# Patient Record
Sex: FEMALE | Race: BLACK OR AFRICAN AMERICAN | NOT HISPANIC OR LATINO | Employment: FULL TIME | URBAN - METROPOLITAN AREA
[De-identification: names, ages, dates, MRNs, and addresses within clinical notes are randomized per-mention and may not be internally consistent; named-entity substitution may affect disease eponyms.]

---

## 2017-01-06 ENCOUNTER — ALLIED HEALTH/NURSE VISIT (OUTPATIENT)
Dept: OBGYN | Facility: CLINIC | Age: 37
End: 2017-01-06
Payer: COMMERCIAL

## 2017-01-06 ENCOUNTER — HOSPITAL ENCOUNTER (OUTPATIENT)
Dept: GENERAL RADIOLOGY | Facility: CLINIC | Age: 37
Discharge: HOME | End: 2017-01-06
Attending: OBSTETRICS & GYNECOLOGY | Admitting: ADVANCED PRACTICE MIDWIFE
Payer: COMMERCIAL

## 2017-01-06 DIAGNOSIS — Z31.41 ENCOUNTER FOR FERTILITY TESTING: ICD-10-CM

## 2017-01-06 DIAGNOSIS — Z01.812 PRE-PROCEDURE LAB EXAM: Primary | ICD-10-CM

## 2017-01-06 LAB
HCG UR QL: NORMAL
INTERNAL QC OK POCT: YES

## 2017-01-06 PROCEDURE — 25500064 ZZH RX 255 OP 636: Performed by: OBSTETRICS & GYNECOLOGY

## 2017-01-06 PROCEDURE — 40000269 ZZH STATISTIC NO CHARGE FACILITY FEE: Mod: ZF

## 2017-01-06 PROCEDURE — 74740 X-RAY FEMALE GENITAL TRACT: CPT

## 2017-01-06 PROCEDURE — 81025 URINE PREGNANCY TEST: CPT | Mod: ZF

## 2017-01-06 PROCEDURE — 25000125 ZZHC RX 250: Performed by: OBSTETRICS & GYNECOLOGY

## 2017-01-06 RX ORDER — IOPAMIDOL 510 MG/ML
50 INJECTION, SOLUTION INTRAVASCULAR ONCE
Status: COMPLETED | OUTPATIENT
Start: 2017-01-06 | End: 2017-01-06

## 2017-01-06 RX ADMIN — LIDOCAINE HYDROCHLORIDE 3 ML: 10 INJECTION, SOLUTION EPIDURAL; INFILTRATION; INTRACAUDAL; PERINEURAL at 14:39

## 2017-01-06 RX ADMIN — IOPAMIDOL 5 ML: 510 INJECTION, SOLUTION INTRAVASCULAR at 14:39

## 2017-01-06 NOTE — NURSING NOTE
Chief Complaint   Patient presents with     Allied Health Visit     UPT for HSG   Carmen Goode LPN

## 2017-01-06 NOTE — PROGRESS NOTES
Sharon Nur is a 37 year old female who presents today for UPT for HSG , UPT was negative.  Carmen Goode , LIN

## 2017-02-20 ENCOUNTER — OFFICE VISIT (OUTPATIENT)
Dept: OBGYN | Facility: CLINIC | Age: 37
End: 2017-02-20
Attending: OBSTETRICS & GYNECOLOGY
Payer: COMMERCIAL

## 2017-02-20 VITALS
WEIGHT: 174.2 LBS | SYSTOLIC BLOOD PRESSURE: 91 MMHG | BODY MASS INDEX: 28 KG/M2 | DIASTOLIC BLOOD PRESSURE: 64 MMHG | HEART RATE: 77 BPM | HEIGHT: 66 IN

## 2017-02-20 DIAGNOSIS — R10.2 PELVIC PAIN IN FEMALE: Primary | ICD-10-CM

## 2017-02-20 DIAGNOSIS — E28.39 DIMINISHED OVARIAN RESERVE DUE TO LOW ANTRAL FOLLICLE: ICD-10-CM

## 2017-02-20 DIAGNOSIS — Z31.41 ENCOUNTER FOR FERTILITY TESTING: ICD-10-CM

## 2017-02-20 DIAGNOSIS — N80.9 ENDOMETRIOSIS: ICD-10-CM

## 2017-02-20 PROCEDURE — 76830 TRANSVAGINAL US NON-OB: CPT | Mod: ZF

## 2017-02-20 PROCEDURE — 99212 OFFICE O/P EST SF 10 MIN: CPT | Mod: ZF

## 2017-02-20 NOTE — PROGRESS NOTES
"Gynecology Visit Note  1/23/17     Reason for visit: Discuss US and labs results     S: Patient is a 38 yo [id number] recently seen by me for concerns of pelvic pain, dyspareunia and concerns for secondary infertility. At that time, plan made for labs and US to assess her symptoms and she presents today to discuss results. Since last visit patient has been having continued pelvic pain and dyspareunia, but feels it is better than previously.     O:  Vitals:    02/20/17 0906   BP: 91/64   BP Location: Left arm   Patient Position: Chair   Pulse: 77   Weight: 79 kg (174 lb 3.2 oz)   Height: 1.664 m (5' 5.5\")      General: NAD, A&Ox3  Resp: Non-labored breathing     Labs 12/29/16:  Component  Latest Ref Rng 12/29/2016   Testosterone Total  8 - 60 ng/dL 18   Sex Hormone Binding Globulin  30 - 135 nmol/L 54   Free Testosterone Calculated  0.13 - 0.92 ng/dL 0.22   TSH  0.40 - 4.00 mU/L 1.32   T4 Free  0.76 - 1.46 ng/dL 1.14   Prolactin  3 - 27 ug/L 16   Anti-Mullerian Hormone  0.272   FSH  7.9   Lutropin  6.1   Estradiol  42      HSG 1/6/17:  Hysterosalpingogram on 1/6/2017     History: Infertility     Technique: Hysterosalpingogram     Fluoroscopy Time: 17 seconds     Images demonstrate patent fallopian tubes bilaterally with normal  appearance of the fallopian tubes. The uterus is normal in appearance.  There is probable spillage on the left and perhaps on the right.      IMPRESSION: No abnormality identified.    Pelvic US 1/23/17:  Uterine findings:  Presence: Visible Size: Normal 5.8x5.5x4.4 cm. Endometrium = 7.2 mm.  Cx length = 42.6 mm.      Flexion: Midposition Position: Midline Margins: Smooth Shape: Normal  Contour: Regular Texture: Homogeneous Cavity: Normal Masses: Normal     Pelvic findings:   Right Adnexa: Normal  Left Adnexa: Normal  Bladder: Normal       Cul - de - sac fluid: None     Ovarian follicles:  Right ovary: 3.4x2.2x2.0cm.     1 follicles     Size(s): [id number]     Cyst with internal echoes - 13 x 13 x " 10mm     Left ovary: 2.9x2.2x1.4cm.     Complex cyst      Size(s): 1.8 x 1.5 x 1.2cm     Comments: Normal uterus. Patient with bilateral complex cysts, likely endometriomas, largest measuring 1.8 cm on left. Recommend repeat imaging in 8 weeks.     A/P: 38 yo  presents to discuss results of labs and US completed for concerns of pelvic pain and secondary infertility  1) Pelvic pain: US with likely small bilateral endometriomas.  Discussed with patient and recommend repeat imaging in 2 months to ensure no enlargement.  Discussed if no desire for pregnancy could consider OCP for treatment of her symptoms.  Also discussed that given current size of her endometriomas would not recommend removal, but if they grow with next ultrasound, could consider surgical management.  2) Secondary infertility: Patient with labs and evidence of low ovarian reserve with AMH of 0.272. Discussed this with patient today and with this and endometriosis, likely cause of inability to conceive. Did offer referral to [facility] given her age and patient agreed with plan.     Nirali Huynh MD

## 2017-02-20 NOTE — LETTER
Date:February 21, 2017      Patient was self referred, no letter generated. Do not send.        [facility] Health Information

## 2017-02-20 NOTE — LETTER
2/20/2017       RE: Sharon Nur  [address]  [address]  [facility] [address]     Dear Colleague,    Thank you for referring your patient, Sharon Nur, to the [facility] at [facility]. Please see a copy of my visit note below.    37 year old female with LMP 01/29/2017 presents for gynecologic ultrasound indicated by secondary infertility, pelvic pain, dyspareunia.  This study was done transvaginally.    Uterine findings:   Presence: Visible Size: Normal 5.8x5.5x4.4 cm.  Endometrium = 7.2 mm.   Cx length = 42.6 mm.      Flexion:  Midposition Position: Midline Margins: Smooth Shape: Normal   Contour: Regular Texture: Homogeneous Cavity: Normal Masses: Normal    Pelvic findings:    Right Adnexa: Normal   Left Adnexa: Normal   Bladder:  Normal         Cul - de - sac fluid: None    Ovarian follicles:   Right ovary:  3.4x2.2x2.0cm.     1 follicles     Size(s):  [id number]                Cyst with internal echoes - 13 x 13 x 10mm     Left ovary:  2.9x2.2x1.4cm.     Complex cyst      Size(s):  1.8 x 1.5 x 1.2cm      Comments:  Normal uterus.  Patient with bilateral complex cysts, likely endometriomas, largest measuring 1.8 cm on left.  Recommend repeat imaging in 8 weeks.    SKYE Meade MD        Again, thank you for allowing me to participate in the care of your patient.      Sincerely,    [facility] Ultrasound

## 2017-02-20 NOTE — LETTER
"2/20/2017       RE: Sharon Nur  [address]  [address]  [facility] [address]     Dear Colleague,    Thank you for referring your patient, Sharon Nur, to the [facility] at [facility]. Please see a copy of my visit note below.    Gynecology Visit Note  1/23/17     Reason for visit: Discuss US and labs results     S: Patient is a 36 yo [id number] recently seen by me for concerns of pelvic pain, dyspareunia and concerns for secondary infertility. At that time, plan made for labs and US to assess her symptoms and she presents today to discuss results. Since last visit patient has been having continued pelvic pain and dyspareunia, but feels it is better than previously.     O:  Vitals:    02/20/17 0906   BP: 91/64   BP Location: Left arm   Patient Position: Chair   Pulse: 77   Weight: 79 kg (174 lb 3.2 oz)   Height: 1.664 m (5' 5.5\")      General: NAD, A&Ox3  Resp: Non-labored breathing     Labs 12/29/16:  Component  Latest Ref Rng 12/29/2016   Testosterone Total  8 - 60 ng/dL 18   Sex Hormone Binding Globulin  30 - 135 nmol/L 54   Free Testosterone Calculated  0.13 - 0.92 ng/dL 0.22   TSH  0.40 - 4.00 mU/L 1.32   T4 Free  0.76 - 1.46 ng/dL 1.14   Prolactin  3 - 27 ug/L 16   Anti-Mullerian Hormone  0.272   FSH  7.9   Lutropin  6.1   Estradiol  42      HSG 1/6/17:  Hysterosalpingogram on 1/6/2017     History: Infertility     Technique: Hysterosalpingogram     Fluoroscopy Time: 17 seconds     Images demonstrate patent fallopian tubes bilaterally with normal  appearance of the fallopian tubes. The uterus is normal in appearance.  There is probable spillage on the left and perhaps on the right.      IMPRESSION: No abnormality identified.    Pelvic US 1/23/17:  Uterine findings:  Presence: Visible Size: Normal 5.8x5.5x4.4 cm. Endometrium = 7.2 mm.  Cx length = 42.6 mm.      Flexion: Midposition Position: Midline Margins: Smooth Shape: Normal  Contour: Regular " Texture: Homogeneous Cavity: Normal Masses: Normal     Pelvic findings:   Right Adnexa: Normal  Left Adnexa: Normal  Bladder: Normal       Cul - de - sac fluid: None     Ovarian follicles:  Right ovary: 3.4x2.2x2.0cm.     1 follicles     Size(s): [id number]     Cyst with internal echoes - 13 x 13 x 10mm     Left ovary: 2.9x2.2x1.4cm.     Complex cyst      Size(s): 1.8 x 1.5 x 1.2cm     Comments: Normal uterus. Patient with bilateral complex cysts, likely endometriomas, largest measuring 1.8 cm on left. Recommend repeat imaging in 8 weeks.     A/P: 36 yo  presents to discuss results of labs and US completed for concerns of pelvic pain and secondary infertility  1) Pelvic pain: US with likely small bilateral endometriomas.  Discussed with patient and recommend repeat imaging in 2 months to ensure no enlargement.  Discussed if no desire for pregnancy could consider OCP for treatment of her symptoms.  Also discussed that given current size of her endometriomas would not recommend removal, but if they grow with next ultrasound, could consider surgical management.  2) Secondary infertility: Patient with labs and evidence of low ovarian reserve with AMH of 0.272. Discussed this with patient today and with this and endometriosis, likely cause of inability to conceive. Did offer referral to [facility] given her age and patient agreed with plan.     Nirali Huynh MD      Again, thank you for allowing me to participate in the care of your patient.      Sincerely,    Nirali Huynh MD

## 2017-02-20 NOTE — PROGRESS NOTES
37 year old female with LMP 01/29/2017 presents for gynecologic ultrasound indicated by secondary infertility, pelvic pain, dyspareunia.  This study was done transvaginally.    Uterine findings:   Presence: Visible Size: Normal 5.8x5.5x4.4 cm.  Endometrium = 7.2 mm.   Cx length = 42.6 mm.      Flexion:  Midposition Position: Midline Margins: Smooth Shape: Normal   Contour: Regular Texture: Homogeneous Cavity: Normal Masses: Normal    Pelvic findings:    Right Adnexa: Normal   Left Adnexa: Normal   Bladder:  Normal         Cul - de - sac fluid: None    Ovarian follicles:   Right ovary:  3.4x2.2x2.0cm.     1 follicles     Size(s):  [id number]                Cyst with internal echoes - 13 x 13 x 10mm     Left ovary:  2.9x2.2x1.4cm.     Complex cyst      Size(s):  1.8 x 1.5 x 1.2cm      Comments:  Normal uterus.  Patient with bilateral complex cysts, likely endometriomas, largest measuring 1.8 cm on left.  Recommend repeat imaging in 8 weeks.    SKYE Meade MD

## 2017-02-20 NOTE — MR AVS SNAPSHOT
After Visit Summary   2017    Sharon Nur    MRN: [id number]           Patient Information     Date Of Birth          1980        Visit Information        Provider Department      2017 8:30 AM [facility] ULTRASOUND [facility]        Today's Diagnoses     Encounter for fertility testing           Follow-ups after your visit        Your next 10 appointments already scheduled     2017  9:45 AM CST   Return Visit with Nirali Huynh MD   [facility] ([facility])    [facility] [address]  [address]  [address]  [facility] [id number] [phone]              Who to contact     Please call your clinic at [phone] to:    Ask questions about your health    Make or cancel appointments    Discuss your medicines    Learn about your test results    Speak to your doctor   If you have compliments or concerns about an experience at your clinic, or if you wish to file a complaint, please contact [facility] Patient Relations at [phone] or email us at [email or web address]         Additional Information About Your Visit        MyChart Information     coJuvo is an electronic gateway that provides easy, online access to your medical records. With coJuvo, you can request a clinic appointment, read your test results, renew a prescription or communicate with your care team.     To sign up for coJuvo visit the website at [email or web address]   You will be asked to enter the access code listed below, as well as some personal information. Please follow the directions to create your username and password.     Your access code is: [email or web address]  Expires: 3/15/2017  9:30 AM     Your access code will  in 90 days. If you need help or a new code, please contact your [facility] Clinic or call [phone] for assistance.        Care EveryWhere ID      This is your Care EveryWhere ID. This could be used by other organizations to access your [facility] medical records  [id number]         Blood Pressure from Last 3 Encounters:   12/29/16 100/67    Weight from Last 3 Encounters:   12/29/16 79 kg (174 lb 1.6 oz)              We Performed the Following     US GYN Complete Transvaginal - 51174 (In Clinic)        Primary Care Provider    None Specified       No primary provider on file.        Thank you!     Thank you for choosing [facility]  for your care. Our goal is always to provide you with excellent care. Hearing back from our patients is one way we can continue to improve our services. Please take a few minutes to complete the written survey that you may receive in the mail after your visit with us. Thank you!             Your Updated Medication List - Protect others around you: Learn how to safely use, store and throw away your medicines at www.disposemymeds.org.      Notice  As of 2/20/2017  9:01 AM    You have not been prescribed any medications.

## 2017-02-20 NOTE — NURSING NOTE
Chief Complaint   Patient presents with     Follow Up For     Pelvic pain x 1 year       See LION Neville 2/20/2017

## 2017-02-20 NOTE — LETTER
Date:February 21, 2017      Patient was self referred, no letter generated. Do not send.        [facility] Health Information

## 2017-02-20 NOTE — MR AVS SNAPSHOT
After Visit Summary   2/20/2017    Sharon Nur    MRN: [id number]           Patient Information     Date Of Birth          1980        Visit Information        Provider Department      2/20/2017 9:45 AM Nirali Huynh MD [facility]        Today's Diagnoses     Pelvic pain in female    -  1    Endometriosis        Diminished ovarian reserve due to low antral follicle           Follow-ups after your visit        Additional Services     INFERTILITY/AI REFERRAL       Your provider has referred you to: [facility] ([facility]) [facility] [phone]   [email or web address]    Please be aware that coverage of these services is subject to the terms and limitations of your health insurance plan.  Call member services at your health plan with any benefit or coverage questions.      Please bring the following with you to your appointment:    (1) Any X-Rays, CTs or MRIs which have been performed.  Contact the facility where they were done to arrange for  prior to your scheduled appointment.    (2) List of current medications   (3) This referral request   (4) Any documents/labs given to you for this referral                  Follow-up notes from your care team     Return in about 2 months (around 4/20/2017) for Pelvic US and f/u with Lino.      Your next 10 appointments already scheduled     Apr 13, 2017  9:30 AM CDT   ULTRASOUND with [facility] ULTRASOUND   [facility] ([facility])    [facility] [address]  [address]  [address]  [place] [address]   [phone]            Apr 13, 2017 10:00 AM CDT   Return Visit with Nirali Huynh MD   [facility] ([facility])    [facility] [address]  [address]  [address]  [place] [address]   [phone]              Future tests that were ordered for you today     Open Future Orders         "Priority Expected Expires Ordered    US GYN Complete Transvaginal - 08565 (In Clinic) Routine  2017            Who to contact     Please call your clinic at [phone] to:    Ask questions about your health    Make or cancel appointments    Discuss your medicines    Learn about your test results    Speak to your doctor   If you have compliments or concerns about an experience at your clinic, or if you wish to file a complaint, please contact [facility] Patient Relations at [phone] or email us at [email or web address]         Additional Information About Your Visit        [email or web address] Information     Vontut is an electronic gateway that provides easy, online access to your medical records. With [employer], you can request a clinic appointment, read your test results, renew a prescription or communicate with your care team.     To sign up for Vontut visit the website at [email or web address]   You will be asked to enter the access code listed below, as well as some personal information. Please follow the directions to create your username and password.     Your access code is: [email or web address]  Expires: 3/15/2017  9:30 AM     Your access code will  in 90 days. If you need help or a new code, please contact your [facility] or call [phone] for assistance.        Care EveryWhere ID     This is your Care EveryWhere ID. This could be used by other organizations to access your [facility] medical records  [id number]        Your Vitals Were     Pulse Height Last Period Breastfeeding? BMI (Body Mass Index)       77 1.664 m (5' 5.5\") 2017 (Exact Date) No 28.55 kg/m2        Blood Pressure from Last 3 Encounters:   17 91/64   16 100/67    Weight from Last 3 Encounters:   17 79 kg (174 lb 3.2 oz)   16 79 kg (174 lb 1.6 oz)              We Performed the Following     INFERTILITY/AI REFERRAL        Primary " Care Provider    None Specified       No primary provider on file.        Thank you!     Thank you for choosing [facility]  for your care. Our goal is always to provide you with excellent care. Hearing back from our patients is one way we can continue to improve our services. Please take a few minutes to complete the written survey that you may receive in the mail after your visit with us. Thank you!             Your Updated Medication List - Protect others around you: Learn how to safely use, store and throw away your medicines at www.disposemymeds.org.      Notice  As of 2/20/2017 10:15 AM    You have not been prescribed any medications.

## 2018-07-16 ENCOUNTER — OFFICE VISIT (OUTPATIENT)
Dept: OBGYN | Facility: CLINIC | Age: 38
End: 2018-07-16
Attending: OBSTETRICS & GYNECOLOGY
Payer: COMMERCIAL

## 2018-07-16 VITALS
BODY MASS INDEX: 28.77 KG/M2 | HEART RATE: 81 BPM | WEIGHT: 179 LBS | HEIGHT: 66 IN | SYSTOLIC BLOOD PRESSURE: 102 MMHG | DIASTOLIC BLOOD PRESSURE: 68 MMHG

## 2018-07-16 DIAGNOSIS — N80.9 ENDOMETRIOSIS: Primary | ICD-10-CM

## 2018-07-16 NOTE — LETTER
Date:July 17, 2018      Patient was self referred, no letter generated. Do not send.        [facility] Health Information

## 2018-07-16 NOTE — LETTER
"7/16/2018       RE: Sharon Nur  [address]  [address]  [place] [address]     Dear Colleague,    Thank you for referring your patient, Sharon Nur, to the [facility] at [facility]. Please see a copy of my visit note below.    Gynecology Visit Note  7/16/18    Reason for visit: Pelvic pain    S: Patient is a 37 yo  seen by me in the past for concerns of pelvic pain, dyspareunia and secondary infertility.  Evaluation showed bilateral small endometriomas in 1/2017 (1.3-1.8 cm), normal HSG and low ovarian reserve with AMH 0.272.  Plan at that time was to repeat US in 2 months to follow size of endometriomas.  Patient did not come for that appointment scheduled 4/13/17.      Today, patient states she did not remember she was supposed to come in for follow-up after her last visit.  She is having worsening pelvic pain and cramping.  States it is the worst right before her period, but has all the time.  She does not take anything for the pain.  She is having regular monthly periods but decreased flow.  She has dyspareunia.  She is wondering what can be done about the pain she is having.    O:  Vitals:    07/16/18 1431   BP: 102/68   Pulse: 81   Weight: 81.2 kg (179 lb)   Height: 1.664 m (5' 5.5\")      General: NAD, A&Ox3  Resp: Non-labored breathing    A/P: 37 yo  presents for follow-up of pelvic pain and endometriosis  1) Pelvic pain/Endometriosis: Given her worsening pain and over 1.5 years since last scan, would recommend repeat Pelvic US.  Discussed Ibu prn to help with pain.  Discussed with discuss management options after Pelvic US and whether medical treatment of surgical treatment is warranted.  Patient understands and is agreeable with this plan.  2) RTC in 2 weeks for Pelvic US and f/u with Lino Huynh MD    Again, thank you for allowing me to participate in the care of your patient.      Sincerely,    Nirali Huynh, " MD

## 2018-07-16 NOTE — PROGRESS NOTES
"Gynecology Visit Note  7/16/18    Reason for visit: Pelvic pain    S: Patient is a 37 yo  seen by me in the past for concerns of pelvic pain, dyspareunia and secondary infertility.  Evaluation showed bilateral small endometriomas in 1/2017 (1.3-1.8 cm), normal HSG and low ovarian reserve with AMH 0.272.  Plan at that time was to repeat US in 2 months to follow size of endometriomas.  Patient did not come for that appointment scheduled 4/13/17.      Today, patient states she did not remember she was supposed to come in for follow-up after her last visit.  She is having worsening pelvic pain and cramping.  States it is the worst right before her period, but has all the time.  She does not take anything for the pain.  She is having regular monthly periods but decreased flow.  She has dyspareunia.  She is wondering what can be done about the pain she is having.    O:  Vitals:    07/16/18 1431   BP: 102/68   Pulse: 81   Weight: 81.2 kg (179 lb)   Height: 1.664 m (5' 5.5\")      General: NAD, A&Ox3  Resp: Non-labored breathing    A/P: 37 yo  presents for follow-up of pelvic pain and endometriosis  1) Pelvic pain/Endometriosis: Given her worsening pain and over 1.5 years since last scan, would recommend repeat Pelvic US.  Discussed Ibu prn to help with pain.  Discussed with discuss management options after Pelvic US and whether medical treatment of surgical treatment is warranted.  Patient understands and is agreeable with this plan.  2) RTC in 2 weeks for Pelvic US and f/u with Lino Huynh MD  "

## 2018-07-16 NOTE — MR AVS SNAPSHOT
"              After Visit Summary   2018    Sharon Nur    MRN: [id number]           Patient Information     Date Of Birth          1980        Visit Information        Provider Department      2018 2:15 PM Nirali Huynh MD [facility]        Today's Diagnoses     Endometriosis    -  1       Follow-ups after your visit        Follow-up notes from your care team     Return in about 2 weeks (around 2018) for Pelvic us and f/u with Lino.      Future tests that were ordered for you today     Open Future Orders        Priority Expected Expires Ordered    US Pelvic Complete with Transvaginal Routine  2019            Who to contact     Please call your clinic at [phone] to:    Ask questions about your health    Make or cancel appointments    Discuss your medicines    Learn about your test results    Speak to your doctor            Additional Information About Your Visit        MyChart Information     [employer] is an electronic gateway that provides easy, online access to your medical records. With [employer], you can request a clinic appointment, read your test results, renew a prescription or communicate with your care team.     To sign up for [employer] visit the website at [email or web address]   You will be asked to enter the access code listed below, as well as some personal information. Please follow the directions to create your username and password.     Your access code is: [email or web address]  Expires: 2018  6:31 AM     Your access code will  in 90 days. If you need help or a new code, please contact your [facility] or call [phone] for assistance.        Care EveryWhere ID     This is your Care EveryWhere ID. This could be used by other organizations to access your [place] medical records  [id number]        Your Vitals Were     Pulse Height Last Period BMI (Body Mass Index)          81 1.664 m (5' 5.5\") " 06/20/2018 (Exact Date) 29.33 kg/m2         Blood Pressure from Last 3 Encounters:   07/16/18 102/68   02/20/17 91/64   12/29/16 100/67    Weight from Last 3 Encounters:   07/16/18 81.2 kg (179 lb)   02/20/17 79 kg (174 lb 3.2 oz)   12/29/16 79 kg (174 lb 1.6 oz)               Primary Care Provider    None Specified       No primary provider on file.        Equal Access to Services     BENTLEY HENRY : Hadii aad ku hadasho Soomaali, waaxda luqadaha, qaybta kaalmada adeegyada, waxay mitchin alverto li . So [facility] [phone].    ATENCIÓN: Si habla español, tiene a francis disposición servicios gratuitos de asistencia lingüística. Llame al [phone].    We comply with applicable federal civil rights laws and [place] laws. We do not discriminate on the basis of race, color, national origin, age, disability, sex, sexual orientation, or gender identity.            Thank you!     Thank you for choosing [facility]  for your care. Our goal is always to provide you with excellent care. Hearing back from our patients is one way we can continue to improve our services. Please take a few minutes to complete the written survey that you may receive in the mail after your visit with us. Thank you!             Your Updated Medication List - Protect others around you: Learn how to safely use, store and throw away your medicines at www.disposemymeds.org.      Notice  As of 7/16/2018  2:53 PM    You have not been prescribed any medications.

## 2018-08-06 ENCOUNTER — OFFICE VISIT (OUTPATIENT)
Dept: OBGYN | Facility: CLINIC | Age: 38
End: 2018-08-06
Attending: OBSTETRICS & GYNECOLOGY
Payer: COMMERCIAL

## 2018-08-06 ENCOUNTER — RADIANT APPOINTMENT (OUTPATIENT)
Dept: ULTRASOUND IMAGING | Facility: CLINIC | Age: 38
End: 2018-08-06
Attending: OBSTETRICS & GYNECOLOGY
Payer: COMMERCIAL

## 2018-08-06 VITALS
BODY MASS INDEX: 29.12 KG/M2 | DIASTOLIC BLOOD PRESSURE: 56 MMHG | SYSTOLIC BLOOD PRESSURE: 91 MMHG | WEIGHT: 181.2 LBS | HEIGHT: 66 IN | HEART RATE: 77 BPM

## 2018-08-06 DIAGNOSIS — N94.6 DYSMENORRHEA: Primary | ICD-10-CM

## 2018-08-06 DIAGNOSIS — N80.9 ENDOMETRIOSIS: ICD-10-CM

## 2018-08-06 PROCEDURE — 76830 TRANSVAGINAL US NON-OB: CPT

## 2018-08-06 RX ORDER — IBUPROFEN 600 MG/1
600 TABLET, FILM COATED ORAL EVERY 6 HOURS PRN
Qty: 60 TABLET | Refills: 0 | Status: SHIPPED | OUTPATIENT
Start: 2018-08-06 | End: 2019-03-28

## 2018-08-06 NOTE — PROGRESS NOTES
"Gynecology Visit Note  8/6/18    Reason for visit: Follow-up US    S: Patient is a 37 yo  with recent visit for worsening pelvic pain with history of prior US showing bilateral endometriomas.  Patient had not been seen since 1/2017 so plan made for repeat US at that time.  Patient had US prior to visit today and is here to discuss results and management options of her pain.    Patient does state she continues to have pain since last visit, but has not tried anything for the pain yet to date.  Wants to know result of US.    O:  BP 91/56  Pulse 77  Ht 1.664 m (5' 5.5\")  Wt 82.2 kg (181 lb 3.2 oz)  LMP 07/21/2018 (Exact Date)  BMI 29.69 kg/m2     General: NAD, A&OX3  Resp: Non-labored breathing    Pelvic US 8/6/18:  ED 6.4 mm.  Has small anterior mid fibroid measuring 1.5 cm.  Dominant follicle in left ovary.  Has complex right ovarian cyst measuring 1.5 cm which is slightly smaller compared to last study in 2/2017.    A/P: 37 yo  presents to discuss US results completed for concerns of pelvic pain and prior US showing small bilateral endometriomas measuring 1.3-1.8 cm  1) Pelvic pain: Discussed with patient resolution of right ovarian cyst, stable/smaller left ovarian cyst.  Discussed no other obvious structural lesions to explain her pain.  Offered consideration for hormonal suppression, patient declines as she strongly desires pregnancy if it were to happen on it's own.  Again discussed Ibuprofen with menses and she is agreeable to trying this now that knows no changes with US.  Patient come back with worsening pain and to discuss attempt at hormonal suppression if desires this.    Nirali Huynh MD  "

## 2018-08-06 NOTE — LETTER
Date:August 8, 2018      Patient was self referred, no letter generated. Do not send.        [facility] Information

## 2018-08-06 NOTE — MR AVS SNAPSHOT
"              After Visit Summary   2018    Sharon Nur    MRN: [id number]           Patient Information     Date Of Birth          1980        Visit Information        Provider Department      2018 2:15 PM Nirali Huynh MD [facility]        Today's Diagnoses     Dysmenorrhea    -  1       Follow-ups after your visit        Who to contact     Please call your clinic at [phone] to:    Ask questions about your health    Make or cancel appointments    Discuss your medicines    Learn about your test results    Speak to your doctor            Additional Information About Your Visit        MyChart Information     [employer] is an electronic gateway that provides easy, online access to your medical records. With [employer], you can request a clinic appointment, read your test results, renew a prescription or communicate with your care team.     To sign up for [employer] visit the website at [email or web address]   You will be asked to enter the access code listed below, as well as some personal information. Please follow the directions to create your username and password.     Your access code is: [email or web address]  Expires: 2018  6:31 AM     Your access code will  in 90 days. If you need help or a new code, please contact your [facility] or call [phone] for assistance.        Care EveryWhere ID     This is your Care EveryWhere ID. This could be used by other organizations to access your [place] medical records  [id number]        Your Vitals Were     Pulse Height Last Period BMI (Body Mass Index)          77 1.664 m (5' 5.5\") 2018 (Exact Date) 29.69 kg/m2         Blood Pressure from Last 3 Encounters:   18 91/56   18 102/68   17 91/64    Weight from Last 3 Encounters:   18 82.2 kg (181 lb 3.2 oz)   18 81.2 kg (179 lb)   17 79 kg (174 lb 3.2 oz)              Today, you had the following     No " orders found for display         Today's Medication Changes          These changes are accurate as of 8/6/18 11:59 PM.  If you have any questions, ask your nurse or doctor.               Start taking these medicines.        Dose/Directions    ibuprofen 600 MG tablet   Commonly known as:  ADVIL/MOTRIN   Used for:  Dysmenorrhea   Started by:  Nirali Huynh MD        Dose:  600 mg   Take 1 tablet (600 mg) by mouth every 6 hours as needed for moderate pain   Quantity:  60 tablet   Refills:  0            Where to get your medicines      These medications were sent to [facility] - [place], [place] - [address]  [address] [address]     Phone:  [phone]     ibuprofen 600 MG tablet                Primary Care Provider    None Specified       No primary provider on file.        Equal Access to Services     [facility] : Zeferino Adams, wamaggie win, qaybta kaalmada lc, elana il . So [facility] [phone].    ATENCIÓN: Si habla español, tiene a francis disposición servicios gratuitos de asistencia lingüística. LlCommunity [facility] [phone].    We comply with applicable federal civil rights laws and [place] laws. We do not discriminate on the basis of race, color, national origin, age, disability, sex, sexual orientation, or gender identity.            Thank you!     Thank you for choosing [facility]  for your care. Our goal is always to provide you with excellent care. Hearing back from our patients is one way we can continue to improve our services. Please take a few minutes to complete the written survey that you may receive in the mail after your visit with us. Thank you!             Your Updated Medication List - Protect others around you: Learn how to safely use, store and throw away your medicines at www.disposemymeds.org.          This list is accurate as of 8/6/18 11:59 PM.  Always use your most recent med  list.                   Brand Name Dispense Instructions for use Diagnosis    ibuprofen 600 MG tablet    ADVIL/MOTRIN    60 tablet    Take 1 tablet (600 mg) by mouth every 6 hours as needed for moderate pain    Dysmenorrhea

## 2019-03-28 ENCOUNTER — HOSPITAL ENCOUNTER (EMERGENCY)
Facility: CLINIC | Age: 39
Discharge: HOME | End: 2019-03-28
Attending: EMERGENCY MEDICINE | Admitting: EMERGENCY MEDICINE
Payer: COMMERCIAL

## 2019-03-28 VITALS
DIASTOLIC BLOOD PRESSURE: 62 MMHG | SYSTOLIC BLOOD PRESSURE: 105 MMHG | TEMPERATURE: 98.1 F | BODY MASS INDEX: 29.25 KG/M2 | RESPIRATION RATE: 18 BRPM | WEIGHT: 178.5 LBS | OXYGEN SATURATION: 100 % | HEART RATE: 89 BPM

## 2019-03-28 DIAGNOSIS — M54.41 ACUTE BILATERAL LOW BACK PAIN WITH BILATERAL SCIATICA: ICD-10-CM

## 2019-03-28 DIAGNOSIS — N94.6 DYSMENORRHEA: ICD-10-CM

## 2019-03-28 DIAGNOSIS — M54.42 ACUTE BILATERAL LOW BACK PAIN WITH BILATERAL SCIATICA: ICD-10-CM

## 2019-03-28 PROCEDURE — 96372 THER/PROPH/DIAG INJ SC/IM: CPT

## 2019-03-28 PROCEDURE — 99284 EMERGENCY DEPT VISIT MOD MDM: CPT

## 2019-03-28 PROCEDURE — 99284 EMERGENCY DEPT VISIT MOD MDM: CPT | Mod: Z6 | Performed by: EMERGENCY MEDICINE

## 2019-03-28 PROCEDURE — 25000128 H RX IP 250 OP 636: Performed by: EMERGENCY MEDICINE

## 2019-03-28 RX ORDER — CYCLOBENZAPRINE HCL 10 MG
10 TABLET ORAL 3 TIMES DAILY PRN
Qty: 20 TABLET | Refills: 0 | Status: SHIPPED | OUTPATIENT
Start: 2019-03-28 | End: 2019-04-03

## 2019-03-28 RX ORDER — IBUPROFEN 600 MG/1
600 TABLET, FILM COATED ORAL EVERY 6 HOURS PRN
Qty: 60 TABLET | Refills: 0 | Status: SHIPPED | OUTPATIENT
Start: 2019-03-28

## 2019-03-28 RX ORDER — KETOROLAC TROMETHAMINE 30 MG/ML
30 INJECTION, SOLUTION INTRAMUSCULAR; INTRAVENOUS ONCE
Status: COMPLETED | OUTPATIENT
Start: 2019-03-28 | End: 2019-03-28

## 2019-03-28 RX ADMIN — KETOROLAC TROMETHAMINE 30 MG: 30 INJECTION, SOLUTION INTRAMUSCULAR; INTRAVENOUS at 10:50

## 2019-03-28 ASSESSMENT — ENCOUNTER SYMPTOMS
BACK PAIN: 1
COLOR CHANGE: 0
ARTHRALGIAS: 0
EYE REDNESS: 0
DIFFICULTY URINATING: 0
HEADACHES: 0
FEVER: 0
NECK STIFFNESS: 0
SHORTNESS OF BREATH: 0
ABDOMINAL PAIN: 0
CONFUSION: 0

## 2019-03-28 NOTE — ED AVS SNAPSHOT
[facility] [place], Emergency Department  [address]  [facility] [address]  Phone:  [phone]  Fax:  [phone]                                    Sharon Nur   MRN: [id number]    Department:  [facility], Emergency Department   Date of Visit:  3/28/2019           After Visit Summary Signature Page    I have received my discharge instructions, and my questions have been answered. I have discussed any challenges I see with this plan with the nurse or doctor.    ..........................................................................................................................................  Patient/Patient Representative Signature      ..........................................................................................................................................  Patient Representative Print Name and Relationship to Patient    ..................................................               ................................................  Date                                   Time    ..........................................................................................................................................  Reviewed by Signature/Title    ...................................................              ..............................................  Date                                               Time          22EPIC Rev 08/18

## 2021-03-11 ENCOUNTER — MEDICAL CORRESPONDENCE (OUTPATIENT)
Dept: HEALTH INFORMATION MANAGEMENT | Facility: CLINIC | Age: 41
End: 2021-03-11

## 2021-03-18 ENCOUNTER — MEDICAL CORRESPONDENCE (OUTPATIENT)
Dept: ULTRASOUND IMAGING | Facility: CLINIC | Age: 41
End: 2021-03-18

## 2022-04-29 ENCOUNTER — ANCILLARY PROCEDURE (OUTPATIENT)
Dept: GENERAL RADIOLOGY | Facility: CLINIC | Age: 42
End: 2022-04-29
Attending: STUDENT IN AN ORGANIZED HEALTH CARE EDUCATION/TRAINING PROGRAM
Payer: COMMERCIAL

## 2022-04-29 DIAGNOSIS — M25.561 ACUTE PAIN OF RIGHT KNEE: ICD-10-CM

## 2022-04-29 PROCEDURE — 73564 X-RAY EXAM KNEE 4 OR MORE: CPT | Mod: RT | Performed by: RADIOLOGY

## 2022-05-18 ENCOUNTER — ANCILLARY PROCEDURE (OUTPATIENT)
Dept: MRI IMAGING | Facility: CLINIC | Age: 42
End: 2022-05-18
Attending: STUDENT IN AN ORGANIZED HEALTH CARE EDUCATION/TRAINING PROGRAM
Payer: COMMERCIAL

## 2022-05-18 DIAGNOSIS — M51.16 LUMBAR DISC DISEASE WITH RADICULOPATHY: ICD-10-CM

## 2022-05-18 PROCEDURE — 72148 MRI LUMBAR SPINE W/O DYE: CPT | Performed by: STUDENT IN AN ORGANIZED HEALTH CARE EDUCATION/TRAINING PROGRAM

## 2023-11-06 ENCOUNTER — APPOINTMENT (OUTPATIENT)
Dept: GENERAL RADIOLOGY | Facility: CLINIC | Age: 43
End: 2023-11-06
Attending: FAMILY MEDICINE
Payer: COMMERCIAL

## 2023-11-06 ENCOUNTER — APPOINTMENT (OUTPATIENT)
Dept: CT IMAGING | Facility: CLINIC | Age: 43
End: 2023-11-06
Attending: FAMILY MEDICINE
Payer: COMMERCIAL

## 2023-11-06 ENCOUNTER — HOSPITAL ENCOUNTER (EMERGENCY)
Facility: CLINIC | Age: 43
Discharge: HOME | End: 2023-11-07
Attending: FAMILY MEDICINE | Admitting: FAMILY MEDICINE
Payer: COMMERCIAL
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
## 2023-11-06 DIAGNOSIS — S89.90XA KNEE INJURY, UNSPECIFIED LATERALITY, INITIAL ENCOUNTER: ICD-10-CM

## 2023-11-06 DIAGNOSIS — S00.83XA FOREHEAD CONTUSION, INITIAL ENCOUNTER: ICD-10-CM

## 2023-11-06 DIAGNOSIS — S09.90XA INJURY OF HEAD, INITIAL ENCOUNTER: ICD-10-CM

## 2023-11-06 LAB
HCG UR QL: NEGATIVE
INTERNAL QC OK POCT: NORMAL
POCT KIT EXPIRATION DATE: NORMAL
POCT KIT LOT NUMBER: NORMAL

## 2023-11-06 PROCEDURE — 250N000013 HC RX MED GY IP 250 OP 250 PS 637: Performed by: FAMILY MEDICINE

## 2023-11-06 PROCEDURE — 99284 EMERGENCY DEPT VISIT MOD MDM: CPT | Mod: 25 | Performed by: FAMILY MEDICINE

## 2023-11-06 PROCEDURE — 71046 X-RAY EXAM CHEST 2 VIEWS: CPT

## 2023-11-06 PROCEDURE — 72125 CT NECK SPINE W/O DYE: CPT

## 2023-11-06 PROCEDURE — 81025 URINE PREGNANCY TEST: CPT | Performed by: FAMILY MEDICINE

## 2023-11-06 PROCEDURE — 73560 X-RAY EXAM OF KNEE 1 OR 2: CPT | Mod: 50

## 2023-11-06 PROCEDURE — 99284 EMERGENCY DEPT VISIT MOD MDM: CPT | Performed by: FAMILY MEDICINE

## 2023-11-06 PROCEDURE — 70450 CT HEAD/BRAIN W/O DYE: CPT

## 2023-11-06 RX ORDER — ACETAMINOPHEN 500 MG
1000 TABLET ORAL ONCE
Status: COMPLETED | OUTPATIENT
Start: 2023-11-06 | End: 2023-11-06

## 2023-11-06 RX ORDER — ACETAMINOPHEN 500 MG
500-1000 TABLET ORAL EVERY 6 HOURS PRN
Qty: 60 TABLET | Refills: 0 | Status: SHIPPED | OUTPATIENT
Start: 2023-11-06 | End: 2023-11-14

## 2023-11-06 RX ADMIN — ACETAMINOPHEN 1000 MG: 500 TABLET ORAL at 20:30

## 2023-11-06 ASSESSMENT — ACTIVITIES OF DAILY LIVING (ADL)
ADLS_ACUITY_SCORE: 35
ADLS_ACUITY_SCORE: 33

## 2023-11-07 VITALS
SYSTOLIC BLOOD PRESSURE: 111 MMHG | WEIGHT: 182.1 LBS | BODY MASS INDEX: 29.84 KG/M2 | TEMPERATURE: 98.2 F | DIASTOLIC BLOOD PRESSURE: 73 MMHG | RESPIRATION RATE: 16 BRPM | OXYGEN SATURATION: 98 % | HEART RATE: 64 BPM

## 2023-11-07 NOTE — ED TRIAGE NOTES
Patient presents with closed head injury around 1830. Patient said she missed a step, fell down, and hit her head. Unknown LOC, not on blood thinners. Visible swelling to L side of eyebrow and middle/right forehead. Denies nausea/vomiting. Steady gait in triage.      Triage Assessment (Adult)       Row Name 11/06/23 1952          Triage Assessment    Airway WDL WDL        Respiratory WDL    Respiratory WDL WDL        Skin Circulation/Temperature WDL    Skin Circulation/Temperature WDL WDL        Cardiac WDL    Cardiac WDL WDL        Peripheral/Neurovascular WDL    Peripheral Neurovascular WDL WDL        Cognitive/Neuro/Behavioral WDL    Cognitive/Neuro/Behavioral WDL WDL

## 2023-11-07 NOTE — ED PROVIDER NOTES
[facility] EMERGENCY DEPARTMENT ([facility])    11/06/23          History     Chief Complaint   Patient presents with    Head Injury     HPI  Sharon Nur is a 43 year old female who with PMH significant for lumbar disc disease with radiculopathy and spinal stenosis of the lumbar region who presents to the Emergency Department for evaluation of head injury.  Patient here with family member who interprets.  Patient is not on anticoagulants.  Patient was walking up a step and fell forward.  Sustained injury to her forehead no true loss of consciousness or seizure activity patient describes a cracking sensation has some neck pain also without weakness in the upper or lower extremities no bowel or bladder problems.  Notes some bilateral knee pain without swelling still able to weight-bear.  No hip pain no other back pain noted no chest pain shortness of breath abdominal pain no nausea vomiting visual changes etc. as noted no otorhinorrhea presents here for evaluation.  This event happened just prior to arrival within the last few hours.    Past Medical History  Past Medical History:   Diagnosis Date    Spinal stenosis      Past Surgical History:   Procedure Laterality Date    NO HISTORY OF SURGERY       acetaminophen (TYLENOL) 500 MG tablet  ibuprofen (ADVIL/MOTRIN) 600 MG tablet      No Known Allergies  Family History  No family history on file.  Social History   Social History     Tobacco Use    Smoking status: Never    Smokeless tobacco: Never   Substance Use Topics    Alcohol use: No     Alcohol/week: 0.0 standard drinks of alcohol    Drug use: No         A medically appropriate review of systems was performed with pertinent positives and negatives noted in the HPI, and all other systems negative.    Physical Exam   BP: 139/79  Pulse: 66  Temp: 97.3  F (36.3  C)  Resp: 18  Weight: 82.6 kg (182 lb 1.6 oz)  SpO2: 98 %  Physical Exam  Vitals and nursing note reviewed.   Constitutional:       General: She is  in acute distress.      Appearance: Normal appearance. She is well-developed. She is not toxic-appearing.      Comments: Patient with family member who interprets.  Contusion to the right mid forehead somewhat of a vertical contusion without laceration mild bruising and also 1 over the left lateral eyebrow area also with smaller no step-off or deformity.  No other major trauma to head no lacerations no otorhinorrhea.  Appropriate   HENT:      Head: Normocephalic.        Comments: See above vertical linear contusions without step-off or deformity laceration     Right Ear: Tympanic membrane normal.      Left Ear: Tympanic membrane normal.      Nose: Nose normal. No rhinorrhea.      Mouth/Throat:      Mouth: Mucous membranes are moist.      Pharynx: Oropharynx is clear.      Comments: No laceration no malocclusion  Eyes:      General: No scleral icterus.     Extraocular Movements: Extraocular movements intact.      Conjunctiva/sclera: Conjunctivae normal.      Pupils: Pupils are equal, round, and reactive to light.   Neck:      Comments: Mild tenderness in the neck area with forage motion anterior neck negative no swelling no dysphonia no crepitus trachea is midline  Cardiovascular:      Rate and Rhythm: Normal rate and regular rhythm.   Pulmonary:      Effort: Pulmonary effort is normal. No respiratory distress.      Breath sounds: Normal breath sounds. No stridor.      Comments: Minimal chest tenderness upper chest area nonspecific no step-off no change in the sternal clavicular junctions etc.  Chest:      Chest wall: Tenderness present.   Abdominal:      General: Abdomen is flat. There is no distension.      Tenderness: There is no abdominal tenderness. There is no guarding.   Musculoskeletal:         General: Tenderness and signs of injury present.      Cervical back: Normal range of motion and neck supple. Tenderness present. No rigidity.      Comments: Bilateral knee tenderness patella is midline there is no  effusions full range of motion no indications for x-rays at this point   Skin:     General: Skin is warm and dry.      Capillary Refill: Capillary refill takes less than 2 seconds.      Coloration: Skin is not jaundiced.      Findings: Bruising present. No rash.   Neurological:      General: No focal deficit present.      Mental Status: She is alert and oriented to person, place, and time. Mental status is at baseline.   Psychiatric:      Comments: Appears appropriate here in the ER for translation           ED Course, Procedures, & Data      Records reviewed in epic.  Patient has been seen for some leg swelling history of right leg swelling seen by [facility] patient has been seen for physical therapy also.    Here in the ER patient had ice applied to forehead given Tylenol for pain control.  Patient was sent over for x-rays of the chest without any pneumothorax rib fractures etc. personally reviewed by myself.  Also bilateral knee x-rays were done without acute effusion joint line stable etc. these were also reviewed by myself.    CT scan of the head and CT scan of the cervical spine did not reveal any acute fracture etc.  On the cervical spine there is a finding that may be a prominence of the tonsillar area.  Recommendations in the future of getting an MRI of the cervical spine I discussed with neurology staff also they felt that this could just follow-up in the future.  Discussed with patient regarding findings and follow-up also.  I did make a copy of all the reports and given to her with her discharge she is to follow-up with MD to review all these also.  They do understand.    Patient otherwise stable is comfortable going home will follow up with MD for recheck in the next week head instructions given Tylenol prescription written ice as needed return if any concerns.  Agree feeling better comfortable discharge with decompensation.  Procedures                     Results for orders placed or performed  during the hospital encounter of 11/06/23   XR Knee Bilateral 1/2 Views     Status: None    Narrative    EXAM: XR KNEE BILATERAL 1/2 VIEWS  LOCATION: [facility]  DATE: 11/6/2023    INDICATION: trauma fall  COMPARISON: None.      Impression    IMPRESSION:   RIGHT KNEE: Normal joint spaces and alignment. No fracture or joint effusion.    LEFT KNEE: Normal joint spaces and alignment. No fracture or joint effusion.   XR Chest 2 Views     Status: None    Narrative    EXAM: XR CHEST 2 VIEWS  LOCATION: [facility]  DATE: 11/6/2023    INDICATION: trauma fall chest injury  COMPARISON: None.      Impression    IMPRESSION: Negative chest.   CT Head w/o Contrast     Status: None    Narrative    EXAM: CT HEAD W/O CONTRAST, CT CERVICAL SPINE W/O CONTRAST  LOCATION: [facility]  DATE: 11/6/2023    INDICATION: fall with forehead trauma with altered mental status  COMPARISON: None.  TECHNIQUE:   1) Routine CT Head without IV contrast. Multiplanar reformats. Dose reduction techniques were used.  2) Routine CT Cervical Spine without IV contrast. Multiplanar reformats. Dose reduction techniques were used.    FINDINGS:   HEAD CT:   INTRACRANIAL CONTENTS: No intracranial hemorrhage, extraaxial collection, or mass effect.  No CT evidence of acute infarct. Minimal low attenuation periventricular white matter lateral ventricles suggestive of gliosis from prior insult or possible   prominent perivascular spaces.  Normal ventricles and sulci. Empty sella and possible cerebellar tonsillar ectopia of 6mm.    VISUALIZED ORBITS/SINUSES/MASTOIDS: No intraorbital abnormality. No paranasal sinus mucosal disease. No middle ear or mastoid effusion.    BONES/SOFT TISSUES: Calvarium intact with tiny scalp hematoma right frontal region.    CERVICAL SPINE CT:   VERTEBRA: Normal vertebral body heights and alignment. No  fracture or posttraumatic subluxation.     CANAL/FORAMINA: No canal or neural foraminal stenosis.    PARASPINAL: No extraspinal abnormality. Visualized lung fields are clear.      Impression    IMPRESSION:  HEAD CT:  1.  No CT evidence of  mass lesion, hemorrhage or focal area suggestive of acute infarct.  2.  Empty sella.  3.  Possible cerebellar tonsillar ectopia of 6mm.  4.  Recommend MR cervical spine in future for further evaluation.    CERVICAL SPINE CT:  1.  No CT evidence for acute fracture or post traumatic subluxation.  2.  No significant canal compromise or neuroforaminal narrowing throughout cervical spine.   Cervical spine CT w/o contrast     Status: None    Narrative    EXAM: CT HEAD W/O CONTRAST, CT CERVICAL SPINE W/O CONTRAST  LOCATION: [facility]  DATE: 11/6/2023    INDICATION: fall with forehead trauma with altered mental status  COMPARISON: None.  TECHNIQUE:   1) Routine CT Head without IV contrast. Multiplanar reformats. Dose reduction techniques were used.  2) Routine CT Cervical Spine without IV contrast. Multiplanar reformats. Dose reduction techniques were used.    FINDINGS:   HEAD CT:   INTRACRANIAL CONTENTS: No intracranial hemorrhage, extraaxial collection, or mass effect.  No CT evidence of acute infarct. Minimal low attenuation periventricular white matter lateral ventricles suggestive of gliosis from prior insult or possible   prominent perivascular spaces.  Normal ventricles and sulci. Empty sella and possible cerebellar tonsillar ectopia of 6mm.    VISUALIZED ORBITS/SINUSES/MASTOIDS: No intraorbital abnormality. No paranasal sinus mucosal disease. No middle ear or mastoid effusion.    BONES/SOFT TISSUES: Calvarium intact with tiny scalp hematoma right frontal region.    CERVICAL SPINE CT:   VERTEBRA: Normal vertebral body heights and alignment. No fracture or posttraumatic subluxation.     CANAL/FORAMINA: No canal or neural foraminal  stenosis.    PARASPINAL: No extraspinal abnormality. Visualized lung fields are clear.      Impression    IMPRESSION:  HEAD CT:  1.  No CT evidence of  mass lesion, hemorrhage or focal area suggestive of acute infarct.  2.  Empty sella.  3.  Possible cerebellar tonsillar ectopia of 6mm.  4.  Recommend MR cervical spine in future for further evaluation.    CERVICAL SPINE CT:  1.  No CT evidence for acute fracture or post traumatic subluxation.  2.  No significant canal compromise or neuroforaminal narrowing throughout cervical spine.   hCG qual urine POCT     Status: Normal   Result Value Ref Range    HCG Qual Urine Negative Negative    Internal QC Check POCT Valid Valid    POCT Kit Lot Number [id number]     POCT Kit Expiration Date 5/29/25      Medications   acetaminophen (TYLENOL) tablet 1,000 mg (1,000 mg Oral $Given 11/6/23 2030)     Labs Ordered and Resulted from Time of ED Arrival to Time of ED Departure - No data to display  Cervical spine CT w/o contrast   Final Result   IMPRESSION:   HEAD CT:   1.  No CT evidence of  mass lesion, hemorrhage or focal area suggestive of acute infarct.   2.  Empty sella.   3.  Possible cerebellar tonsillar ectopia of 6mm.   4.  Recommend MR cervical spine in future for further evaluation.      CERVICAL SPINE CT:   1.  No CT evidence for acute fracture or post traumatic subluxation.   2.  No significant canal compromise or neuroforaminal narrowing throughout cervical spine.      CT Head w/o Contrast   Final Result   IMPRESSION:   HEAD CT:   1.  No CT evidence of  mass lesion, hemorrhage or focal area suggestive of acute infarct.   2.  Empty sella.   3.  Possible cerebellar tonsillar ectopia of 6mm.   4.  Recommend MR cervical spine in future for further evaluation.      CERVICAL SPINE CT:   1.  No CT evidence for acute fracture or post traumatic subluxation.   2.  No significant canal compromise or neuroforaminal narrowing throughout cervical spine.      XR Chest 2 Views    Final Result   IMPRESSION: Negative chest.      XR Knee Bilateral 1/2 Views   Final Result   IMPRESSION:    RIGHT KNEE: Normal joint spaces and alignment. No fracture or joint effusion.      LEFT KNEE: Normal joint spaces and alignment. No fracture or joint effusion.             Critical care was not performed.     Medical Decision Making  The patient's presentation was of moderate complexity (an acute complicated injury).    The patient's evaluation involved:  an assessment requiring an independent historian (see separate area of note for details)  review of external note(s) from 3+ sources (see separate area of note for details)  review of 3+ test result(s) ordered prior to this encounter (see separate area of note for details)  ordering and/or review of 3+ test(s) in this encounter (see separate area of note for details)  discussion of management or test interpretation with another health professional (see separate area of note for details)    The patient's management necessitated moderate risk (prescription drug management including medications given in the ED).    Assessment & Plan   43-year-old female who tripped and fell without loss of consciousness sustained injury to the forehead primarily also bilateral knees and the upper anterior chest without respiratory difficulty swallowing or speech changes.  Here in the ER she is not on anticoagulants noted otherwise.  No laceration is here with family member who interprets.  Patient with 2 areas of contusion to her forehead noted no step-off deformity otherwise had some neck pain but neurologically intact we evaluated her x-rays of the knees and chest were negative CT of the C-spine along with head were negative for any acute traumatic injuries I reviewed with neurology as there is a comment on potential variant of a tonsillar area on the cervical spine with recommendations for follow-up in the future if needed an MRI would be more appropriate I talked the  patient I talked to neurology they felt with follow-up with no indications for any emergent imaging or evaluation.  Patient is aware agrees I did make a copy of all the reports instructed patient follow-up with their physician in the next week for review of her symptoms along with this reviewing all the imaging with the radiological interpretations and then further work-up as needed.  Head injury instructions given return to concerns Tylenol ice patient and family member agree with plan.       I have reviewed the nursing notes. I have reviewed the findings, diagnosis, plan and need for follow up with the patient.    Discharge Medication List as of 11/6/2023 11:54 PM        START taking these medications    Details   acetaminophen (TYLENOL) 500 MG tablet Take 1-2 tablets (500-1,000 mg) by mouth every 6 hours as needed for pain or fever, Disp-60 tablet, R-0, Local Print             Final diagnoses:   Fall, initial encounter   Injury of head, initial encounter   Forehead contusion, initial encounter   Knee injury, unspecified laterality, initial encounter - bilateral       Yordy Abdalla  [facility] EMERGENCY DEPARTMENT  11/6/2023    This note was created at least in part by the use of dragon voice dictation system. Inadvertent typographical errors may still exist.  Yordy Abdalla MD.  Patient evaluated in the emergency department during the COVID-19 pandemic period. Careful attention to patients safety was addressed throughout the evaluation. Evaluation and treatment management was initiated with disposition made efficiently and appropriate as possible to minimize any risk of potential exposure to patient during this evaluation.       Yordy Abdalla MD  11/07/23 [id number]

## 2023-11-17 NOTE — LETTER
"8/6/2018       RE: Sharon Nur  [address]  [address]  [place] [facility] [address]     Dear Colleague,    Thank you for referring your patient, Sharon Nur, to the [facility] at [facility]. Please see a copy of my visit note below.    Gynecology Visit Note  8/6/18    Reason for visit: Follow-up US    S: Patient is a 37 yo  with recent visit for worsening pelvic pain with history of prior US showing bilateral endometriomas.  Patient had not been seen since 1/2017 so plan made for repeat US at that time.  Patient had US prior to visit today and is here to discuss results and management options of her pain.    Patient does state she continues to have pain since last visit, but has not tried anything for the pain yet to date.  Wants to know result of US.    O:  BP 91/56  Pulse 77  Ht 1.664 m (5' 5.5\")  Wt 82.2 kg (181 lb 3.2 oz)  LMP 07/21/2018 (Exact Date)  BMI 29.69 kg/m2     General: NAD, A&OX3  Resp: Non-labored breathing    Pelvic US 8/6/18:  ED 6.4 mm.  Has small anterior mid fibroid measuring 1.5 cm.  Dominant follicle in left ovary.  Has complex right ovarian cyst measuring 1.5 cm which is slightly smaller compared to last study in 2/2017.    A/P: 37 yo  presents to discuss US results completed for concerns of pelvic pain and prior US showing small bilateral endometriomas measuring 1.3-1.8 cm  1) Pelvic pain: Discussed with patient resolution of right ovarian cyst, stable/smaller left ovarian cyst.  Discussed no other obvious structural lesions to explain her pain.  Offered consideration for hormonal suppression, patient declines as she strongly desires pregnancy if it were to happen on it's own.  Again discussed Ibuprofen with menses and she is agreeable to trying this now that knows no changes with US.  Patient come back with worsening pain and to discuss attempt at hormonal suppression if desires this.    Nirali Huynh MD    Again, " thank you for allowing me to participate in the care of your patient.      Sincerely,    Nirali Huynh MD       Fluid/Electrolyte/Metabolic Respiratory

## 2024-01-10 ENCOUNTER — MEDICAL CORRESPONDENCE (OUTPATIENT)
Dept: SCHEDULING | Facility: CLINIC | Age: 44
End: 2024-01-10
Payer: COMMERCIAL

## 2024-01-11 ENCOUNTER — TRANSCRIBE ORDERS (OUTPATIENT)
Dept: OTHER | Age: 44
End: 2024-01-11

## 2024-01-11 DIAGNOSIS — M79.602 LEFT ARM PAIN: ICD-10-CM

## 2024-01-11 DIAGNOSIS — G89.29 CHRONIC LEFT SHOULDER PAIN: Primary | ICD-10-CM

## 2024-01-11 DIAGNOSIS — M25.512 CHRONIC LEFT SHOULDER PAIN: Primary | ICD-10-CM

## 2024-01-11 DIAGNOSIS — M25.522 LEFT ELBOW PAIN: ICD-10-CM

## 2024-01-15 ENCOUNTER — ANCILLARY PROCEDURE (OUTPATIENT)
Dept: MAMMOGRAPHY | Facility: CLINIC | Age: 44
End: 2024-01-15
Attending: ADVANCED PRACTICE MIDWIFE
Payer: COMMERCIAL

## 2024-01-15 DIAGNOSIS — Z12.39 ENCOUNTER FOR SCREENING FOR MALIGNANT NEOPLASM OF BREAST, UNSPECIFIED SCREENING MODALITY: ICD-10-CM

## 2024-01-15 PROCEDURE — 77067 SCR MAMMO BI INCL CAD: CPT | Mod: 26 | Performed by: RADIOLOGY

## 2024-01-15 PROCEDURE — 77067 SCR MAMMO BI INCL CAD: CPT

## 2024-01-17 DIAGNOSIS — M25.512 PAIN IN JOINT OF LEFT SHOULDER: Primary | ICD-10-CM

## 2024-01-17 NOTE — TELEPHONE ENCOUNTER
DIAGNOSIS: chronic L shoulder pain/L elbow pain/L arm pain/[facility]/ortho con    APPOINTMENT DATE: 1/22/2024    NOTES STATUS DETAILS   OFFICE NOTE from referring provider Internal   Lisha Burger CNM      DISCHARGE REPORT from the ER Internal Fall, Initial    MEDICATION LIST Internal    XRAYS (IMAGES & REPORTS) In process- Scheduled 1/22/2024 Xray Shoulder

## 2024-01-22 ENCOUNTER — ANCILLARY PROCEDURE (OUTPATIENT)
Dept: GENERAL RADIOLOGY | Facility: CLINIC | Age: 44
End: 2024-01-22
Attending: FAMILY MEDICINE
Payer: COMMERCIAL

## 2024-01-22 ENCOUNTER — OFFICE VISIT (OUTPATIENT)
Dept: ORTHOPEDICS | Facility: CLINIC | Age: 44
End: 2024-01-22
Payer: COMMERCIAL

## 2024-01-22 ENCOUNTER — PRE VISIT (OUTPATIENT)
Dept: ORTHOPEDICS | Facility: CLINIC | Age: 44
End: 2024-01-22

## 2024-01-22 DIAGNOSIS — R29.898 SHOULDER WEAKNESS: Primary | ICD-10-CM

## 2024-01-22 DIAGNOSIS — M75.102 ROTATOR CUFF SYNDROME OF LEFT SHOULDER: ICD-10-CM

## 2024-01-22 DIAGNOSIS — M75.42 SHOULDER IMPINGEMENT SYNDROME, LEFT: ICD-10-CM

## 2024-01-22 DIAGNOSIS — M25.512 PAIN IN JOINT OF LEFT SHOULDER: ICD-10-CM

## 2024-01-22 PROCEDURE — 99203 OFFICE O/P NEW LOW 30 MIN: CPT | Performed by: FAMILY MEDICINE

## 2024-01-22 PROCEDURE — 73030 X-RAY EXAM OF SHOULDER: CPT | Mod: LT | Performed by: RADIOLOGY

## 2024-01-22 NOTE — PROGRESS NOTES
Left shoulder pain    Today, the patient reports no injury. Worsening left shoulder pain since onset.  She demonstrates in clinic that the left shoulder will be sharply uncomfortable with movements to AB duct the shoulder and also with overhead reaching.  She denies a known history of shoulder injury.  She denies numbness and tingling in the upper extremity.  She denies any improvement in the shoulder with physical therapy.    Patient was seen physical therapy for left-sided shoulder discomfort over the last 6 months, for 5-6 visits at [facility].  Physical therapy notes 6/27/2023 reviewed by me.  Patient was working on rotator cuff and scapular stabilization exercises, at physical therapy visit 8/22/2023 patient still had left-sided shoulder impingement pain and was working on rotator cuff exercises and adjunct ultrasound.    Patient seen by certified nurse midwife at [facility] 1/10/2024 with left upper arm discomfort, shoulder to left elbow.  Urine hCG negative at that visit 1/10/2024.  Clinic note reviewed by me.  Negative chlamydia, gonorrhea, nonreactive syphilis.  Normal TSH.  Hemoglobin A1c 5.3%.  Normal vitamin D    CT scan cervical spine 11/6/2023 showed no significant neuroforaminal or central canal stenosis or impingement.        PMH:  Past Medical History:   Diagnosis Date    Spinal stenosis        Active problem list:  There is no problem list on file for this patient.      FH:  No family history on file.    SH:  Social History     Socioeconomic History    Marital status:      Spouse name: Not on file    Number of children: Not on file    Years of education: Not on file    Highest education level: Not on file   Occupational History    Not on file   Tobacco Use    Smoking status: Never    Smokeless tobacco: Never   Substance and Sexual Activity    Alcohol use: No     Alcohol/week: 0.0 standard drinks of alcohol    Drug use: No    Sexual activity: Yes     Partners:  Male     Birth control/protection: None   Other Topics Concern    Not on file   Social History Narrative    Not on file     Social Determinants of Health     Financial Resource Strain: Not on file   Food Insecurity: Not on file   Transportation Needs: Not on file   Physical Activity: Not on file   Stress: Not on file   Social Connections: Not on file   Interpersonal Safety: Not on file   Housing Stability: Not on file       MEDS:  See EMR, reviewed  ALL:  See EMR, reviewed    REVIEW OF SYSTEMS:  CONSTITUTIONAL:NEGATIVE for fever, chills, change in weight  INTEGUMENTARY/SKIN: NEGATIVE for worrisome rashes, moles or lesions  EYES: NEGATIVE for vision changes or irritation  ENT/MOUTH: NEGATIVE for ear, mouth and throat problems  RESP:NEGATIVE for significant cough or SOB  BREAST: NEGATIVE for masses, tenderness or discharge  CV: NEGATIVE for chest pain, palpitations or peripheral edema  GI: NEGATIVE for nausea, abdominal pain, heartburn, or change in bowel habits  :NEGATIVE for frequency, dysuria, or hematuria  :NEGATIVE for frequency, dysuria, or hematuria  NEURO: NEGATIVE for weakness, dizziness or paresthesias  ENDOCRINE: NEGATIVE for temperature intolerance, skin/hair changes  HEME/ALLERGY/IMMUNE: NEGATIVE for bleeding problems  PSYCHIATRIC: NEGATIVE for changes in mood or affect      Objective: Overhead impingement signs are positive to Neer's test and Reyes maneuver.  In the supine position on the table she has full passive range of motion of both shoulders with no signs of a frozen shoulder bilaterally.  In the upright position she has 5- out of 5 supraspinatus and infraspinatus strength on the left.  She has 5 out of 5 subscapularis and deltoid strength on the left.  She has normal rotator cuff strength on the right.  She is mildly tender over the anterior cuff, nontender over the AC joint or biceps tendon.  She tends towards a head forward, shoulder forward posture.  No evidence of scapular winging.   Appropriate in conversation and affect.    I personally viewed with the patient x-rays of the shoulder on the left that show no significant glenohumeral or AC joint DJD with a type I-II acromion.      Assessment: Persistent left shoulder impingement pain despite physical therapy over the last 6 months    Plan: We discussed the option of empiric subacromial injection or proceeding with MRI of the shoulder.  Patient declined the subacromial injection for now.  MRI of the left shoulder is pending.  Face-to-face follow-up after the MRI to go over options.

## 2024-01-22 NOTE — LETTER
1/22/2024      RE: Sharon Nur  [address] [place] [address]     Dear Colleague,    Thank you for referring your patient, Sharon Nur, to the [facility] [place]. Please see a copy of my visit note below.    Left shoulder pain    Today, the patient reports no injury. Worsening left shoulder pain since onset.  She demonstrates in clinic that the left shoulder will be sharply uncomfortable with movements to AB duct the shoulder and also with overhead reaching.  She denies a known history of shoulder injury.  She denies numbness and tingling in the upper extremity.  She denies any improvement in the shoulder with physical therapy.    Patient was seen physical therapy for left-sided shoulder discomfort over the last 6 months, for 5-6 visits at [facility].  Physical therapy notes 6/27/2023 reviewed by me.  Patient was working on rotator cuff and scapular stabilization exercises, at physical therapy visit 8/22/2023 patient still had left-sided shoulder impingement pain and was working on rotator cuff exercises and adjunct ultrasound.    Patient seen by certified nurse midwife at [facility] 1/10/2024 with left upper arm discomfort, shoulder to left elbow.  Urine hCG negative at that visit 1/10/2024.  Clinic note reviewed by me.  Negative chlamydia, gonorrhea, nonreactive syphilis.  Normal TSH.  Hemoglobin A1c 5.3%.  Normal vitamin D    CT scan cervical spine 11/6/2023 showed no significant neuroforaminal or central canal stenosis or impingement.        PMH:  Past Medical History:   Diagnosis Date    Spinal stenosis        Active problem list:  There is no problem list on file for this patient.      FH:  No family history on file.    SH:  Social History     Socioeconomic History    Marital status:      Spouse name: Not on file    Number of children: Not on file    Years of education: Not on file    Highest education level: Not  on file   Occupational History    Not on file   Tobacco Use    Smoking status: Never    Smokeless tobacco: Never   Substance and Sexual Activity    Alcohol use: No     Alcohol/week: 0.0 standard drinks of alcohol    Drug use: No    Sexual activity: Yes     Partners: Male     Birth control/protection: None   Other Topics Concern    Not on file   Social History Narrative    Not on file     Social Determinants of Health     Financial Resource Strain: Not on file   Food Insecurity: Not on file   Transportation Needs: Not on file   Physical Activity: Not on file   Stress: Not on file   Social Connections: Not on file   Interpersonal Safety: Not on file   Housing Stability: Not on file       MEDS:  See EMR, reviewed  ALL:  See EMR, reviewed    REVIEW OF SYSTEMS:  CONSTITUTIONAL:NEGATIVE for fever, chills, change in weight  INTEGUMENTARY/SKIN: NEGATIVE for worrisome rashes, moles or lesions  EYES: NEGATIVE for vision changes or irritation  ENT/MOUTH: NEGATIVE for ear, mouth and throat problems  RESP:NEGATIVE for significant cough or SOB  BREAST: NEGATIVE for masses, tenderness or discharge  CV: NEGATIVE for chest pain, palpitations or peripheral edema  GI: NEGATIVE for nausea, abdominal pain, heartburn, or change in bowel habits  :NEGATIVE for frequency, dysuria, or hematuria  :NEGATIVE for frequency, dysuria, or hematuria  NEURO: NEGATIVE for weakness, dizziness or paresthesias  ENDOCRINE: NEGATIVE for temperature intolerance, skin/hair changes  HEME/ALLERGY/IMMUNE: NEGATIVE for bleeding problems  PSYCHIATRIC: NEGATIVE for changes in mood or affect      Objective: Overhead impingement signs are positive to Neer's test and Reyes maneuver.  In the supine position on the table she has full passive range of motion of both shoulders with no signs of a frozen shoulder bilaterally.  In the upright position she has 5- out of 5 supraspinatus and infraspinatus strength on the left.  She has 5 out of 5 subscapularis and  deltoid strength on the left.  She has normal rotator cuff strength on the right.  She is mildly tender over the anterior cuff, nontender over the AC joint or biceps tendon.  She tends towards a head forward, shoulder forward posture.  No evidence of scapular winging.  Appropriate in conversation and affect.    I personally viewed with the patient x-rays of the shoulder on the left that show no significant glenohumeral or AC joint DJD with a type I-II acromion.      Assessment: Persistent left shoulder impingement pain despite physical therapy over the last 6 months    Plan: We discussed the option of empiric subacromial injection or proceeding with MRI of the shoulder.  Patient declined the subacromial injection for now.  MRI of the left shoulder is pending.  Face-to-face follow-up after the MRI to go over options.    Again, thank you for allowing me to participate in the care of your patient.      Sincerely,    Hemant Gross MD

## 2024-02-05 ENCOUNTER — ANCILLARY PROCEDURE (OUTPATIENT)
Dept: MRI IMAGING | Facility: CLINIC | Age: 44
End: 2024-02-05
Attending: FAMILY MEDICINE
Payer: COMMERCIAL

## 2024-02-05 DIAGNOSIS — R29.898 SHOULDER WEAKNESS: ICD-10-CM

## 2024-02-05 DIAGNOSIS — M75.102 ROTATOR CUFF SYNDROME OF LEFT SHOULDER: ICD-10-CM

## 2024-02-05 DIAGNOSIS — M75.42 SHOULDER IMPINGEMENT SYNDROME, LEFT: ICD-10-CM

## 2024-02-05 PROCEDURE — 73221 MRI JOINT UPR EXTREM W/O DYE: CPT | Mod: LT | Performed by: RADIOLOGY

## (undated) RX ORDER — LIDOCAINE HYDROCHLORIDE 10 MG/ML
INJECTION, SOLUTION EPIDURAL; INFILTRATION; INTRACAUDAL; PERINEURAL
Status: DISPENSED
Start: 2017-01-06